# Patient Record
Sex: MALE | Race: ASIAN | NOT HISPANIC OR LATINO | URBAN - METROPOLITAN AREA
[De-identification: names, ages, dates, MRNs, and addresses within clinical notes are randomized per-mention and may not be internally consistent; named-entity substitution may affect disease eponyms.]

---

## 2017-10-19 ENCOUNTER — EMERGENCY (EMERGENCY)
Facility: HOSPITAL | Age: 22
LOS: 1 days | Discharge: HOME | End: 2017-10-19
Attending: EMERGENCY MEDICINE | Admitting: EMERGENCY MEDICINE
Payer: MEDICAID

## 2017-10-19 VITALS
DIASTOLIC BLOOD PRESSURE: 74 MMHG | OXYGEN SATURATION: 99 % | HEART RATE: 77 BPM | TEMPERATURE: 98 F | RESPIRATION RATE: 16 BRPM | SYSTOLIC BLOOD PRESSURE: 105 MMHG

## 2017-10-19 VITALS
RESPIRATION RATE: 12 BRPM | HEART RATE: 69 BPM | SYSTOLIC BLOOD PRESSURE: 100 MMHG | OXYGEN SATURATION: 99 % | DIASTOLIC BLOOD PRESSURE: 62 MMHG

## 2017-10-19 DIAGNOSIS — R56.9 UNSPECIFIED CONVULSIONS: ICD-10-CM

## 2017-10-19 LAB
ALBUMIN SERPL ELPH-MCNC: 4.6 G/DL — SIGNIFICANT CHANGE UP (ref 3.3–5)
ALP SERPL-CCNC: 40 U/L — SIGNIFICANT CHANGE UP (ref 40–120)
ALT FLD-CCNC: 16 U/L — SIGNIFICANT CHANGE UP (ref 4–41)
AST SERPL-CCNC: 20 U/L — SIGNIFICANT CHANGE UP (ref 4–40)
BASOPHILS # BLD AUTO: 0.03 K/UL — SIGNIFICANT CHANGE UP (ref 0–0.2)
BASOPHILS NFR BLD AUTO: 0.4 % — SIGNIFICANT CHANGE UP (ref 0–2)
BILIRUB SERPL-MCNC: 0.9 MG/DL — SIGNIFICANT CHANGE UP (ref 0.2–1.2)
BUN SERPL-MCNC: 13 MG/DL — SIGNIFICANT CHANGE UP (ref 7–23)
CALCIUM SERPL-MCNC: 9.1 MG/DL — SIGNIFICANT CHANGE UP (ref 8.4–10.5)
CHLORIDE SERPL-SCNC: 100 MMOL/L — SIGNIFICANT CHANGE UP (ref 98–107)
CO2 SERPL-SCNC: 26 MMOL/L — SIGNIFICANT CHANGE UP (ref 22–31)
CREAT SERPL-MCNC: 0.89 MG/DL — SIGNIFICANT CHANGE UP (ref 0.5–1.3)
EOSINOPHIL # BLD AUTO: 0.09 K/UL — SIGNIFICANT CHANGE UP (ref 0–0.5)
EOSINOPHIL NFR BLD AUTO: 1.3 % — SIGNIFICANT CHANGE UP (ref 0–6)
GLUCOSE SERPL-MCNC: 88 MG/DL — SIGNIFICANT CHANGE UP (ref 70–99)
HCT VFR BLD CALC: 44.1 % — SIGNIFICANT CHANGE UP (ref 39–50)
HGB BLD-MCNC: 15.7 G/DL — SIGNIFICANT CHANGE UP (ref 13–17)
IMM GRANULOCYTES # BLD AUTO: 0.02 # — SIGNIFICANT CHANGE UP
IMM GRANULOCYTES NFR BLD AUTO: 0.3 % — SIGNIFICANT CHANGE UP (ref 0–1.5)
LYMPHOCYTES # BLD AUTO: 1.34 K/UL — SIGNIFICANT CHANGE UP (ref 1–3.3)
LYMPHOCYTES # BLD AUTO: 19.1 % — SIGNIFICANT CHANGE UP (ref 13–44)
MCHC RBC-ENTMCNC: 29.7 PG — SIGNIFICANT CHANGE UP (ref 27–34)
MCHC RBC-ENTMCNC: 35.6 % — SIGNIFICANT CHANGE UP (ref 32–36)
MCV RBC AUTO: 83.4 FL — SIGNIFICANT CHANGE UP (ref 80–100)
MONOCYTES # BLD AUTO: 0.37 K/UL — SIGNIFICANT CHANGE UP (ref 0–0.9)
MONOCYTES NFR BLD AUTO: 5.3 % — SIGNIFICANT CHANGE UP (ref 2–14)
NEUTROPHILS # BLD AUTO: 5.17 K/UL — SIGNIFICANT CHANGE UP (ref 1.8–7.4)
NEUTROPHILS NFR BLD AUTO: 73.6 % — SIGNIFICANT CHANGE UP (ref 43–77)
NRBC # FLD: 0 — SIGNIFICANT CHANGE UP
PLATELET # BLD AUTO: 202 K/UL — SIGNIFICANT CHANGE UP (ref 150–400)
PMV BLD: 11.3 FL — SIGNIFICANT CHANGE UP (ref 7–13)
POTASSIUM SERPL-MCNC: 4.2 MMOL/L — SIGNIFICANT CHANGE UP (ref 3.5–5.3)
POTASSIUM SERPL-SCNC: 4.2 MMOL/L — SIGNIFICANT CHANGE UP (ref 3.5–5.3)
PROT SERPL-MCNC: 7.6 G/DL — SIGNIFICANT CHANGE UP (ref 6–8.3)
RBC # BLD: 5.29 M/UL — SIGNIFICANT CHANGE UP (ref 4.2–5.8)
RBC # FLD: 11.7 % — SIGNIFICANT CHANGE UP (ref 10.3–14.5)
SODIUM SERPL-SCNC: 139 MMOL/L — SIGNIFICANT CHANGE UP (ref 135–145)
WBC # BLD: 7.02 K/UL — SIGNIFICANT CHANGE UP (ref 3.8–10.5)
WBC # FLD AUTO: 7.02 K/UL — SIGNIFICANT CHANGE UP (ref 3.8–10.5)

## 2017-10-19 PROCEDURE — 99283 EMERGENCY DEPT VISIT LOW MDM: CPT

## 2017-10-19 PROCEDURE — 70450 CT HEAD/BRAIN W/O DYE: CPT | Mod: 26

## 2017-10-19 PROCEDURE — 71020: CPT | Mod: 26

## 2017-10-19 RX ORDER — LEVETIRACETAM 250 MG/1
500 TABLET, FILM COATED ORAL ONCE
Qty: 0 | Refills: 0 | Status: COMPLETED | OUTPATIENT
Start: 2017-10-19 | End: 2017-10-19

## 2017-10-19 RX ORDER — LEVETIRACETAM 250 MG/1
1 TABLET, FILM COATED ORAL
Qty: 120 | Refills: 0 | OUTPATIENT
Start: 2017-10-19 | End: 2017-12-18

## 2017-10-19 RX ADMIN — LEVETIRACETAM 500 MILLIGRAM(S): 250 TABLET, FILM COATED ORAL at 19:45

## 2017-10-19 NOTE — ED PROVIDER NOTE - PLAN OF CARE
- A prescription for Keppra has been sent to your pharmacy - please take 500mg twice a day  - Call [phone] to follow up with Neurology  - Please return with any new or worsening symptoms

## 2017-10-19 NOTE — ED ADULT NURSE NOTE - CHIEF COMPLAINT QUOTE
Pt here for seizure lasting approximately 20 seconds, has hx of seizures, currently on keppra, pt currently A&Ox3, pt was on couch during seizure, did not hit head, no incontinence. denies any injuries for seizures.

## 2017-10-19 NOTE — ED ADULT NURSE NOTE - OBJECTIVE STATEMENT
Break RN: 20 y/o male pt accompanied by wife and father, received in room 25, presents to the ed after a seizure episode, pt sts he was working and suddenly had a seizure, pt not able to give clear account of what happened, says he does not remember anything, denies falling to the floor, hitting head. pt c/o feeling like his heart is beating fast and reports intermittent L sided headache, L eye noted with redness but sts it's not new. MD crocker pending, 20G placed on L AC, VS as noted, fall prec in place, NAD, will monitor.

## 2017-10-19 NOTE — ED PROVIDER NOTE - CARE PLAN
Principal Discharge DX:	Seizure Principal Discharge DX:	Seizure  Instructions for follow-up, activity and diet:	- A prescription for Keppra has been sent to your pharmacy - please take 500mg twice a day  - Call [phone] to follow up with Neurology  - Please return with any new or worsening symptoms

## 2017-10-19 NOTE — ED ADULT TRIAGE NOTE - CHIEF COMPLAINT QUOTE
Pt here for seizure lasting approximately 20 seconds, has hx of seizures, currently on keppra, per ems pt A&OX3 currently. pt was on couch during seizure, did not hit head, no incontinence. denies any injuries for seizures. Pt here for seizure lasting approximately 20 seconds, has hx of seizures, currently on keppra, pt currently A&Ox3, pt was on couch during seizure, did not hit head, no incontinence. denies any injuries for seizures.

## 2017-10-19 NOTE — ED PROVIDER NOTE - MEDICAL DECISION MAKING DETAILS
21yoM hx of seizures pw seizure with headache. labs, ct head due to headaches, consult neurology. likely dc with close follow up.

## 2017-10-19 NOTE — ED PROVIDER NOTE - OBJECTIVE STATEMENT
21yoM hx of seizures on keppra pw likely seizure. per patient he wasn't feeling well, had some stomach pain and then layed down. then remembers waking up to EMS putting oxxygen on him. per family, he was shaking all over. typical for his seizures is belly pain before hand. patient also had a few months of headache, point headache on right side, comes and goes. no fevers, vomiting, diarrhea, chest pain, current abd pain or other issues.

## 2017-10-19 NOTE — CONSULT NOTE ADULT - SUBJECTIVE AND OBJECTIVE BOX
HPI: 22 yo male with history of GTC for 6 years. Last seizure was a year ago. Was on keppra 500mg bid then weaned down by a doctor in [facility] to 250mg bid. Today was at work then felt faint and passed out and had generalized shaking and toungue biting and confusion afterwards. No workup in the past for seizures. Now back to baseline           MEDICATIONS  (STANDING):    MEDICATIONS  (PRN):    PAST MEDICAL & SURGICAL HISTORY:  EP (epilepsy)  No significant past surgical history    FAMILY HISTORY:  No pertinent family history in first degree relatives    Allergies    No Known Allergies    Intolerances        SHx - No smoking, No ETOH, No drug abuse      Review of Systems:  CONSTITUTIONAL:  No weight loss, fever, chills, weakness or fatigue.  HEENT:  Eyes:  No visual loss, blurred vision, double vision or yellow sclerae. Ears, Nose, Throat:  No hearing loss, sneezing, congestion, runny nose or sore throat.  SKIN:  No rash or itching.  CARDIOVASCULAR:  No chest pain, chest pressure or chest discomfort. No palpitations or edema.  RESPIRATORY:  No shortness of breath, cough or sputum.  GASTROINTESTINAL:  No anorexia, nausea, vomiting or diarrhea. No abdominal pain or blood.  GENITOURINARY:  NO Burning on urination.   NEUROLOGICAL: See HPI  MUSCULOSKELETAL:  No muscle, back pain, joint pain or stiffness.  HEMATOLOGIC:  No anemia, bleeding or bruising.  LYMPHATICS:  No enlarged nodes. No history of splenectomy.  PSYCHIATRIC:  No history of depression or anxiety.  ENDOCRINOLOGIC:  No reports of sweating, cold or heat intolerance. No polyuria or polydipsia.  ALLERGIES:  No history of asthma, hives, eczema or rhinitis.        Vital Signs Last 24 Hrs  T(C): 36.8 (19 Oct 2017 15:56), Max: 36.8 (19 Oct 2017 15:56)  T(F): 98.3 (19 Oct 2017 15:56), Max: 98.3 (19 Oct 2017 15:56)  HR: 69 (19 Oct 2017 17:08) (69 - 77)  BP: 100/62 (19 Oct 2017 17:08) (100/62 - 105/74)  BP(mean): --  RR: 12 (19 Oct 2017 17:08) (12 - 16)  SpO2: 99% (19 Oct 2017 17:08) (99% - 99%)    General Exam:   General appearance: No acute distress                   Neurological Exam:  Mental Status: Orientated to self, date and place.  Attention intact.  No dysarthria, aphasia or neglect.  Knowledge intact.  Registration intact.  Short and long term memory grossly intact.      Cranial Nerves: CN I - not tested.  PERRL, EOMI, VFF, no nystagmus or diplopia.  No APD.  Fundi not visualized bilaterally.  CN V1-3 intact to light touch and pinprick.  No facial asymmetry.  Hearing intact to finger rub bilaterally.  Tongue, uvula and palate midline.  Sternocleidomastoid and Trapezius intact bilaterally.    Motor:   Tone: normal.                  Strength:     [] Upper extremity                      Delt       Bicep    Tricep                                                  R         5/5 5/5 5/5 5/5                                               L          5/5 5/5 5/5 5/5  [] Lower extremity                       HF          KE          KF        DF         PF                                               R        5/5 5/5 5/5 5/5 5/5                                               L         5/5 5/5 5/5 5/5 5/5  Pronator drift: none                 Dysmetria: None to finger-nose-finger or heel-shin-heel  No truncal ataxia.    Tremor: No resting, postural or action tremor.  No myoclonus.    Sensation: intact to light touch, pinprick, vibration and proprioception    Deep Tendon Reflexes: 1+ bilateral biceps, triceps, brachioradialis, knee and ankle  Toes flexor bilaterally    Gait: normal and stable.      Other:    10-19    139  |  100  |  13  ----------------------------<  88  4.2   |  26  |  0.89    Ca    9.1      19 Oct 2017 17:07    TPro  7.6  /  Alb  4.6  /  TBili  0.9  /  DBili  x   /  AST  20  /  ALT  16  /  AlkPhos  40  10-19    10-19    139  |  100  |  13  ----------------------------<  88  4.2   |  26  |  0.89    Ca    9.1      19 Oct 2017 17:07    TPro  7.6  /  Alb  4.6  /  TBili  0.9  /  DBili  x   /  AST  20  /  ALT  16  /  AlkPhos  40  10-19    ct head < from: CT Head No Cont (10.19.17 @ 18:49) >  No CT evidence of acute intracranial hemorrhage, brain edema, or mass   effect.     < end of copied text >                            15.7   7.02  )-----------( 202      ( 19 Oct 2017 17:07 )             44.1       Radiology    CT  MRI  EKG:  tele:  TTE:  EEG:

## 2017-10-19 NOTE — ED PROVIDER NOTE - ATTENDING CONTRIBUTION TO CARE
21M p/w seizure today.  Pt was working at a [employer], felt unwell generally and then went upstairs (an apt) and lay down and reportedly had a seizure x 10-15 minutes.  Reportedly last seizure 5 years ago, was here and admitted to Dr Long, but he has been in [facility] and has not seen him for years.  Pt was seeing a doctor in [facility] who decreased his dose to current dose which is 250 PO BID.  Denies head injury or illness, but does c/o some epigastric pain as well as off and on headache x 1-2 months.  VS:  unremarkable    GEN - NAD; well appearing; A+O x3   HEAD - NC/AT     ENT - PEERL, EOMI, mucous membranes  moist , no discharge      NECK: Neck supple, non-tender without lymphadenopathy, no masses, no JVD  PULM - CTA b/l,  symmetric breath sounds  COR -  normal heart sounds    ABD - , ND, NT, soft, no guarding, no rebound, no masses    BACK - no CVA tenderness, nontender spine     EXTREMS - no edema, no deformity, warm and well perfused    SKIN - no rash or bruising      NEUROLOGIC - alert, CN 2-12 intact, sensation nl, motor 5/5 RUE/LUE/RLE/LLE.      IMP:  21M p/w seizure today, last 5 years ago, on low dose Keppra.  Normal MS and neuro exam at present.  No abd ttp.  No sign of trauma.  Check labs, CT head given HA and ?unclear length from previous imaging (pt not sure).  Neuro eval.  Reassess.

## 2017-10-21 LAB — LEVETIRACETAM SERPL-MCNC: 4.6 MCG/ML — LOW (ref 12–46)

## 2017-10-28 NOTE — ED ADULT NURSE NOTE - NS ED NURSE RECORD ANOTHER VITAL SIGN
Yes
no decreased eating/drinking/no difficulty bearing weight/no headache/no sleeping issues/no crying/no bruising/no laceration/no loss of consciousness/no disorientation/no fussiness

## 2017-11-27 ENCOUNTER — APPOINTMENT (OUTPATIENT)
Dept: NEUROLOGY | Facility: HOSPITAL | Age: 22
End: 2017-11-27

## 2017-11-27 ENCOUNTER — OUTPATIENT (OUTPATIENT)
Dept: OUTPATIENT SERVICES | Facility: HOSPITAL | Age: 22
LOS: 1 days | End: 2017-11-27

## 2017-11-27 VITALS — SYSTOLIC BLOOD PRESSURE: 118 MMHG | WEIGHT: 107.59 LBS | DIASTOLIC BLOOD PRESSURE: 70 MMHG | HEART RATE: 78 BPM

## 2017-11-27 DIAGNOSIS — R56.9 UNSPECIFIED CONVULSIONS: ICD-10-CM

## 2017-12-11 DIAGNOSIS — G40.909 EPILEPSY, UNSPECIFIED, NOT INTRACTABLE, WITHOUT STATUS EPILEPTICUS: ICD-10-CM

## 2017-12-11 DIAGNOSIS — R56.9 UNSPECIFIED CONVULSIONS: ICD-10-CM

## 2018-01-25 ENCOUNTER — APPOINTMENT (OUTPATIENT)
Dept: NEUROLOGY | Facility: CLINIC | Age: 23
End: 2018-01-25
Payer: MEDICAID

## 2018-01-25 PROCEDURE — 95819 EEG AWAKE AND ASLEEP: CPT

## 2018-02-12 ENCOUNTER — APPOINTMENT (OUTPATIENT)
Dept: NEUROLOGY | Facility: HOSPITAL | Age: 23
End: 2018-02-12

## 2018-02-12 ENCOUNTER — OUTPATIENT (OUTPATIENT)
Dept: OUTPATIENT SERVICES | Facility: HOSPITAL | Age: 23
LOS: 1 days | End: 2018-02-12

## 2018-02-12 VITALS
DIASTOLIC BLOOD PRESSURE: 63 MMHG | HEIGHT: 64 IN | BODY MASS INDEX: 18.27 KG/M2 | WEIGHT: 107 LBS | HEART RATE: 69 BPM | SYSTOLIC BLOOD PRESSURE: 111 MMHG

## 2018-02-12 DIAGNOSIS — G40.909 EPILEPSY, UNSPECIFIED, NOT INTRACTABLE, W/OUT STATUS EPILEPTICUS: ICD-10-CM

## 2018-02-13 DIAGNOSIS — G40.909 EPILEPSY, UNSPECIFIED, NOT INTRACTABLE, WITHOUT STATUS EPILEPTICUS: ICD-10-CM

## 2018-03-09 ENCOUNTER — RX RENEWAL (OUTPATIENT)
Age: 23
End: 2018-03-09

## 2018-03-18 ENCOUNTER — APPOINTMENT (OUTPATIENT)
Dept: MRI IMAGING | Facility: IMAGING CENTER | Age: 23
End: 2018-03-18
Payer: MEDICAID

## 2018-03-18 ENCOUNTER — OUTPATIENT (OUTPATIENT)
Dept: OUTPATIENT SERVICES | Facility: HOSPITAL | Age: 23
LOS: 1 days | End: 2018-03-18
Payer: MEDICAID

## 2018-03-18 DIAGNOSIS — Z00.8 ENCOUNTER FOR OTHER GENERAL EXAMINATION: ICD-10-CM

## 2018-03-18 PROCEDURE — 70553 MRI BRAIN STEM W/O & W/DYE: CPT | Mod: 26

## 2018-03-18 PROCEDURE — 70553 MRI BRAIN STEM W/O & W/DYE: CPT

## 2018-03-18 PROCEDURE — A9585: CPT

## 2018-03-19 ENCOUNTER — RX RENEWAL (OUTPATIENT)
Age: 23
End: 2018-03-19

## 2018-03-19 RX ORDER — LEVETIRACETAM 500 MG/1
500 TABLET, FILM COATED ORAL TWICE DAILY
Qty: 120 | Refills: 5 | Status: ACTIVE | COMMUNITY
Start: 1900-01-01 | End: 1900-01-01

## 2018-06-11 ENCOUNTER — APPOINTMENT (OUTPATIENT)
Dept: NEUROLOGY | Facility: HOSPITAL | Age: 23
End: 2018-06-11

## 2018-09-10 ENCOUNTER — APPOINTMENT (OUTPATIENT)
Dept: NEUROLOGY | Facility: HOSPITAL | Age: 23
End: 2018-09-10

## 2018-12-17 ENCOUNTER — EMERGENCY (EMERGENCY)
Facility: HOSPITAL | Age: 23
LOS: 1 days | Discharge: HOME | End: 2018-12-17
Attending: EMERGENCY MEDICINE | Admitting: EMERGENCY MEDICINE
Payer: MEDICAID

## 2018-12-17 VITALS
TEMPERATURE: 98 F | RESPIRATION RATE: 18 BRPM | SYSTOLIC BLOOD PRESSURE: 100 MMHG | OXYGEN SATURATION: 99 % | HEART RATE: 65 BPM | DIASTOLIC BLOOD PRESSURE: 64 MMHG

## 2018-12-17 VITALS
HEART RATE: 80 BPM | RESPIRATION RATE: 18 BRPM | TEMPERATURE: 98 F | SYSTOLIC BLOOD PRESSURE: 110 MMHG | DIASTOLIC BLOOD PRESSURE: 74 MMHG | OXYGEN SATURATION: 100 %

## 2018-12-17 LAB
ALBUMIN SERPL ELPH-MCNC: 4.5 G/DL — SIGNIFICANT CHANGE UP (ref 3.3–5)
ALP SERPL-CCNC: 46 U/L — SIGNIFICANT CHANGE UP (ref 40–120)
ALT FLD-CCNC: 95 U/L — HIGH (ref 4–41)
AST SERPL-CCNC: 41 U/L — HIGH (ref 4–40)
BASOPHILS # BLD AUTO: 0.04 K/UL — SIGNIFICANT CHANGE UP (ref 0–0.2)
BASOPHILS NFR BLD AUTO: 0.6 % — SIGNIFICANT CHANGE UP (ref 0–2)
BILIRUB SERPL-MCNC: 0.6 MG/DL — SIGNIFICANT CHANGE UP (ref 0.2–1.2)
BUN SERPL-MCNC: 16 MG/DL — SIGNIFICANT CHANGE UP (ref 7–23)
CALCIUM SERPL-MCNC: 9.1 MG/DL — SIGNIFICANT CHANGE UP (ref 8.4–10.5)
CHLORIDE SERPL-SCNC: 103 MMOL/L — SIGNIFICANT CHANGE UP (ref 98–107)
CO2 SERPL-SCNC: 26 MMOL/L — SIGNIFICANT CHANGE UP (ref 22–31)
CREAT SERPL-MCNC: 0.8 MG/DL — SIGNIFICANT CHANGE UP (ref 0.5–1.3)
EOSINOPHIL # BLD AUTO: 0.17 K/UL — SIGNIFICANT CHANGE UP (ref 0–0.5)
EOSINOPHIL NFR BLD AUTO: 2.7 % — SIGNIFICANT CHANGE UP (ref 0–6)
GLUCOSE SERPL-MCNC: 86 MG/DL — SIGNIFICANT CHANGE UP (ref 70–99)
HCT VFR BLD CALC: 45.3 % — SIGNIFICANT CHANGE UP (ref 39–50)
HGB BLD-MCNC: 15.9 G/DL — SIGNIFICANT CHANGE UP (ref 13–17)
IMM GRANULOCYTES # BLD AUTO: 0.01 # — SIGNIFICANT CHANGE UP
IMM GRANULOCYTES NFR BLD AUTO: 0.2 % — SIGNIFICANT CHANGE UP (ref 0–1.5)
LYMPHOCYTES # BLD AUTO: 1.83 K/UL — SIGNIFICANT CHANGE UP (ref 1–3.3)
LYMPHOCYTES # BLD AUTO: 29 % — SIGNIFICANT CHANGE UP (ref 13–44)
MCHC RBC-ENTMCNC: 29.3 PG — SIGNIFICANT CHANGE UP (ref 27–34)
MCHC RBC-ENTMCNC: 35.1 % — SIGNIFICANT CHANGE UP (ref 32–36)
MCV RBC AUTO: 83.4 FL — SIGNIFICANT CHANGE UP (ref 80–100)
MONOCYTES # BLD AUTO: 0.53 K/UL — SIGNIFICANT CHANGE UP (ref 0–0.9)
MONOCYTES NFR BLD AUTO: 8.4 % — SIGNIFICANT CHANGE UP (ref 2–14)
NEUTROPHILS # BLD AUTO: 3.72 K/UL — SIGNIFICANT CHANGE UP (ref 1.8–7.4)
NEUTROPHILS NFR BLD AUTO: 59.1 % — SIGNIFICANT CHANGE UP (ref 43–77)
NRBC # FLD: 0 — SIGNIFICANT CHANGE UP
PLATELET # BLD AUTO: 186 K/UL — SIGNIFICANT CHANGE UP (ref 150–400)
PMV BLD: 10.7 FL — SIGNIFICANT CHANGE UP (ref 7–13)
POTASSIUM SERPL-MCNC: 4.1 MMOL/L — SIGNIFICANT CHANGE UP (ref 3.5–5.3)
POTASSIUM SERPL-SCNC: 4.1 MMOL/L — SIGNIFICANT CHANGE UP (ref 3.5–5.3)
PROT SERPL-MCNC: 7.6 G/DL — SIGNIFICANT CHANGE UP (ref 6–8.3)
RBC # BLD: 5.43 M/UL — SIGNIFICANT CHANGE UP (ref 4.2–5.8)
RBC # FLD: 11.5 % — SIGNIFICANT CHANGE UP (ref 10.3–14.5)
SODIUM SERPL-SCNC: 140 MMOL/L — SIGNIFICANT CHANGE UP (ref 135–145)
WBC # BLD: 6.3 K/UL — SIGNIFICANT CHANGE UP (ref 3.8–10.5)
WBC # FLD AUTO: 6.3 K/UL — SIGNIFICANT CHANGE UP (ref 3.8–10.5)

## 2018-12-17 PROCEDURE — 99284 EMERGENCY DEPT VISIT MOD MDM: CPT

## 2018-12-17 PROCEDURE — 70450 CT HEAD/BRAIN W/O DYE: CPT | Mod: 26

## 2018-12-17 RX ORDER — SODIUM CHLORIDE 9 MG/ML
1000 INJECTION INTRAMUSCULAR; INTRAVENOUS; SUBCUTANEOUS ONCE
Qty: 0 | Refills: 0 | Status: COMPLETED | OUTPATIENT
Start: 2018-12-17 | End: 2018-12-17

## 2018-12-17 RX ORDER — DIPHENHYDRAMINE HCL 50 MG
50 CAPSULE ORAL ONCE
Qty: 0 | Refills: 0 | Status: COMPLETED | OUTPATIENT
Start: 2018-12-17 | End: 2018-12-17

## 2018-12-17 RX ORDER — KETOROLAC TROMETHAMINE 30 MG/ML
15 SYRINGE (ML) INJECTION ONCE
Qty: 0 | Refills: 0 | Status: DISCONTINUED | OUTPATIENT
Start: 2018-12-17 | End: 2018-12-17

## 2018-12-17 RX ORDER — METOCLOPRAMIDE HCL 10 MG
10 TABLET ORAL ONCE
Qty: 0 | Refills: 0 | Status: COMPLETED | OUTPATIENT
Start: 2018-12-17 | End: 2018-12-17

## 2018-12-17 RX ORDER — ONDANSETRON 8 MG/1
4 TABLET, FILM COATED ORAL ONCE
Qty: 0 | Refills: 0 | Status: COMPLETED | OUTPATIENT
Start: 2018-12-17 | End: 2018-12-17

## 2018-12-17 RX ORDER — ACETAMINOPHEN 500 MG
975 TABLET ORAL ONCE
Qty: 0 | Refills: 0 | Status: COMPLETED | OUTPATIENT
Start: 2018-12-17 | End: 2018-12-17

## 2018-12-17 RX ADMIN — Medication 15 MILLIGRAM(S): at 19:16

## 2018-12-17 RX ADMIN — Medication 50 MILLIGRAM(S): at 19:29

## 2018-12-17 RX ADMIN — Medication 975 MILLIGRAM(S): at 21:12

## 2018-12-17 RX ADMIN — SODIUM CHLORIDE 1000 MILLILITER(S): 9 INJECTION INTRAMUSCULAR; INTRAVENOUS; SUBCUTANEOUS at 19:16

## 2018-12-17 RX ADMIN — Medication 10 MILLIGRAM(S): at 19:16

## 2018-12-17 NOTE — ED PROVIDER NOTE - NSFOLLOWUPINSTRUCTIONS_ED_ALL_ED_FT
Please follow up with a primary care physician to repeat your liver function tests.  They were slightly elevated in the emergency department today.

## 2018-12-17 NOTE — ED PROVIDER NOTE - PROGRESS NOTE DETAILS
patient feels improved, discharge with copy of results and recommend pcp follow up, given return precautions. patient feels improved, discharge with copy of results and recommend pcp follow up, given return precautions.  Discussed slightly elevated LFTs, recommend repeat outpatient

## 2018-12-17 NOTE — ED PROVIDER NOTE - MEDICAL DECISION MAKING DETAILS
21 y/o M presents to ED with gradual onset headache with nausea and vomiting since yesterday. No red flags on exam, but will CT head to r/o mass affect and provide symptomatic treatment.

## 2018-12-17 NOTE — ED PROVIDER NOTE - NSFOLLOWUPCLINICS_GEN_ALL_ED_FT
[facility] General Internal Medicine  General Internal Medicine  [address] [id number]  Phone: [phone]  Fax:   Follow Up Time:

## 2018-12-17 NOTE — ED PROVIDER NOTE - OBJECTIVE STATEMENT
21 y/o M with PMHx of seizure presents to ED headache since yesterday. Pt states that at work he had a gradual onset of headache and shortly after he felt nausea with abdominal pain. Associated with these symptoms pt has been vomiting. Pt reports the room was spinning yesterday, but not to much today. Pt denies having any fever. Pt is currently on Keppra for seizures.

## 2023-06-09 ENCOUNTER — EMERGENCY (EMERGENCY)
Facility: HOSPITAL | Age: 28
LOS: 1 days | Discharge: HOME | End: 2023-06-09
Admitting: EMERGENCY MEDICINE
Payer: MEDICAID

## 2023-06-09 VITALS
RESPIRATION RATE: 18 BRPM | HEART RATE: 88 BPM | TEMPERATURE: 98 F | OXYGEN SATURATION: 100 % | SYSTOLIC BLOOD PRESSURE: 120 MMHG | DIASTOLIC BLOOD PRESSURE: 81 MMHG

## 2023-06-09 VITALS
RESPIRATION RATE: 17 BRPM | OXYGEN SATURATION: 100 % | SYSTOLIC BLOOD PRESSURE: 105 MMHG | HEART RATE: 82 BPM | DIASTOLIC BLOOD PRESSURE: 65 MMHG | TEMPERATURE: 98 F

## 2023-06-09 LAB
ALBUMIN SERPL ELPH-MCNC: 4.7 G/DL — SIGNIFICANT CHANGE UP (ref 3.3–5)
ALP SERPL-CCNC: 39 U/L — LOW (ref 40–120)
ALT FLD-CCNC: 12 U/L — SIGNIFICANT CHANGE UP (ref 4–41)
ANION GAP SERPL CALC-SCNC: 13 MMOL/L — SIGNIFICANT CHANGE UP (ref 7–14)
AST SERPL-CCNC: 20 U/L — SIGNIFICANT CHANGE UP (ref 4–40)
B PERT DNA SPEC QL NAA+PROBE: SIGNIFICANT CHANGE UP
B PERT+PARAPERT DNA PNL SPEC NAA+PROBE: SIGNIFICANT CHANGE UP
BASOPHILS # BLD AUTO: 0.03 K/UL — SIGNIFICANT CHANGE UP (ref 0–0.2)
BASOPHILS NFR BLD AUTO: 0.4 % — SIGNIFICANT CHANGE UP (ref 0–2)
BILIRUB SERPL-MCNC: 0.6 MG/DL — SIGNIFICANT CHANGE UP (ref 0.2–1.2)
BORDETELLA PARAPERTUSSIS (RAPRVP): SIGNIFICANT CHANGE UP
BUN SERPL-MCNC: 16 MG/DL — SIGNIFICANT CHANGE UP (ref 7–23)
C PNEUM DNA SPEC QL NAA+PROBE: SIGNIFICANT CHANGE UP
CALCIUM SERPL-MCNC: 9.4 MG/DL — SIGNIFICANT CHANGE UP (ref 8.4–10.5)
CHLORIDE SERPL-SCNC: 101 MMOL/L — SIGNIFICANT CHANGE UP (ref 98–107)
CO2 SERPL-SCNC: 23 MMOL/L — SIGNIFICANT CHANGE UP (ref 22–31)
CREAT SERPL-MCNC: 0.79 MG/DL — SIGNIFICANT CHANGE UP (ref 0.5–1.3)
EGFR: 123 ML/MIN/1.73M2 — SIGNIFICANT CHANGE UP
EOSINOPHIL # BLD AUTO: 0.13 K/UL — SIGNIFICANT CHANGE UP (ref 0–0.5)
EOSINOPHIL NFR BLD AUTO: 1.6 % — SIGNIFICANT CHANGE UP (ref 0–6)
FLUAV SUBTYP SPEC NAA+PROBE: SIGNIFICANT CHANGE UP
FLUBV RNA SPEC QL NAA+PROBE: SIGNIFICANT CHANGE UP
GLUCOSE SERPL-MCNC: 83 MG/DL — SIGNIFICANT CHANGE UP (ref 70–99)
HADV DNA SPEC QL NAA+PROBE: SIGNIFICANT CHANGE UP
HCOV 229E RNA SPEC QL NAA+PROBE: SIGNIFICANT CHANGE UP
HCOV HKU1 RNA SPEC QL NAA+PROBE: SIGNIFICANT CHANGE UP
HCOV NL63 RNA SPEC QL NAA+PROBE: SIGNIFICANT CHANGE UP
HCOV OC43 RNA SPEC QL NAA+PROBE: SIGNIFICANT CHANGE UP
HCT VFR BLD CALC: 45.3 % — SIGNIFICANT CHANGE UP (ref 39–50)
HGB BLD-MCNC: 15.9 G/DL — SIGNIFICANT CHANGE UP (ref 13–17)
HMPV RNA SPEC QL NAA+PROBE: SIGNIFICANT CHANGE UP
HPIV1 RNA SPEC QL NAA+PROBE: SIGNIFICANT CHANGE UP
HPIV2 RNA SPEC QL NAA+PROBE: SIGNIFICANT CHANGE UP
HPIV3 RNA SPEC QL NAA+PROBE: SIGNIFICANT CHANGE UP
HPIV4 RNA SPEC QL NAA+PROBE: SIGNIFICANT CHANGE UP
IANC: 6.5 K/UL — SIGNIFICANT CHANGE UP (ref 1.8–7.4)
IMM GRANULOCYTES NFR BLD AUTO: 0.2 % — SIGNIFICANT CHANGE UP (ref 0–0.9)
LYMPHOCYTES # BLD AUTO: 0.9 K/UL — LOW (ref 1–3.3)
LYMPHOCYTES # BLD AUTO: 11.2 % — LOW (ref 13–44)
M PNEUMO DNA SPEC QL NAA+PROBE: SIGNIFICANT CHANGE UP
MAGNESIUM SERPL-MCNC: 2.2 MG/DL — SIGNIFICANT CHANGE UP (ref 1.6–2.6)
MCHC RBC-ENTMCNC: 28.9 PG — SIGNIFICANT CHANGE UP (ref 27–34)
MCHC RBC-ENTMCNC: 35.1 GM/DL — SIGNIFICANT CHANGE UP (ref 32–36)
MCV RBC AUTO: 82.2 FL — SIGNIFICANT CHANGE UP (ref 80–100)
MONOCYTES # BLD AUTO: 0.49 K/UL — SIGNIFICANT CHANGE UP (ref 0–0.9)
MONOCYTES NFR BLD AUTO: 6.1 % — SIGNIFICANT CHANGE UP (ref 2–14)
NEUTROPHILS # BLD AUTO: 6.5 K/UL — SIGNIFICANT CHANGE UP (ref 1.8–7.4)
NEUTROPHILS NFR BLD AUTO: 80.5 % — HIGH (ref 43–77)
NRBC # BLD: 0 /100 WBCS — SIGNIFICANT CHANGE UP (ref 0–0)
NRBC # FLD: 0 K/UL — SIGNIFICANT CHANGE UP (ref 0–0)
PHOSPHATE SERPL-MCNC: 3.4 MG/DL — SIGNIFICANT CHANGE UP (ref 2.5–4.5)
PLATELET # BLD AUTO: 212 K/UL — SIGNIFICANT CHANGE UP (ref 150–400)
POTASSIUM SERPL-MCNC: 4.6 MMOL/L — SIGNIFICANT CHANGE UP (ref 3.5–5.3)
POTASSIUM SERPL-SCNC: 4.6 MMOL/L — SIGNIFICANT CHANGE UP (ref 3.5–5.3)
PROT SERPL-MCNC: 7.5 G/DL — SIGNIFICANT CHANGE UP (ref 6–8.3)
RAPID RVP RESULT: SIGNIFICANT CHANGE UP
RBC # BLD: 5.51 M/UL — SIGNIFICANT CHANGE UP (ref 4.2–5.8)
RBC # FLD: 11.6 % — SIGNIFICANT CHANGE UP (ref 10.3–14.5)
RSV RNA SPEC QL NAA+PROBE: SIGNIFICANT CHANGE UP
RV+EV RNA SPEC QL NAA+PROBE: SIGNIFICANT CHANGE UP
SARS-COV-2 RNA SPEC QL NAA+PROBE: SIGNIFICANT CHANGE UP
SODIUM SERPL-SCNC: 137 MMOL/L — SIGNIFICANT CHANGE UP (ref 135–145)
WBC # BLD: 8.07 K/UL — SIGNIFICANT CHANGE UP (ref 3.8–10.5)
WBC # FLD AUTO: 8.07 K/UL — SIGNIFICANT CHANGE UP (ref 3.8–10.5)

## 2023-06-09 PROCEDURE — 71046 X-RAY EXAM CHEST 2 VIEWS: CPT | Mod: 26

## 2023-06-09 PROCEDURE — 99284 EMERGENCY DEPT VISIT MOD MDM: CPT

## 2023-06-09 PROCEDURE — 70450 CT HEAD/BRAIN W/O DYE: CPT | Mod: 26,MA

## 2023-06-09 RX ORDER — SODIUM CHLORIDE 9 MG/ML
1000 INJECTION INTRAMUSCULAR; INTRAVENOUS; SUBCUTANEOUS ONCE
Refills: 0 | Status: COMPLETED | OUTPATIENT
Start: 2023-06-09 | End: 2023-06-09

## 2023-06-09 RX ORDER — ACETAMINOPHEN 500 MG
1000 TABLET ORAL ONCE
Refills: 0 | Status: COMPLETED | OUTPATIENT
Start: 2023-06-09 | End: 2023-06-09

## 2023-06-09 RX ORDER — MECLIZINE HCL 12.5 MG
1 TABLET ORAL
Qty: 15 | Refills: 0
Start: 2023-06-09 | End: 2023-06-13

## 2023-06-09 RX ADMIN — Medication 400 MILLIGRAM(S): at 19:54

## 2023-06-09 RX ADMIN — SODIUM CHLORIDE 1000 MILLILITER(S): 9 INJECTION INTRAMUSCULAR; INTRAVENOUS; SUBCUTANEOUS at 19:54

## 2023-06-09 NOTE — ED PROVIDER NOTE - OBJECTIVE STATEMENT
28 Y/O M w/ PMH of seizure on Keppra presents to ER for fever. Has experience weakness and subjective fever for one week. No recent travel or sick contacts. Developed frontal headache 2 days ago. Improves w/ Tylenol but continues to endorse symptoms. Last seizure 7 years ago. Denies fever, chills, dizziness, visual change/loss, chest pain, shortness of breath, abdominal pain, dysuria

## 2023-06-09 NOTE — ED PROVIDER NOTE - PATIENT PORTAL LINK FT
You can access the FollowMyHealth Patient Portal offered by [facility] by registering at the following website: [email or web address]. By joining [employer]’s FollowMyHealth portal, you will also be able to view your health information using other applications (apps) compatible with our system. You can access the FollowMyHealth Patient Portal offered by [facility] by registering at the following website: [email or web address]. By joining [employer]’s FollowMyHealth portal, you will also be able to view your health information using other applications (apps) compatible with our system.

## 2023-06-09 NOTE — ED PROVIDER NOTE - INTERNATIONAL TRAVEL
8/12/22    Attempt: #1    Contacted patient to schedule ER follow up with Janette Webb. Phone not in service at time of call, unable to leave message.    Divina Salcido  Patient    No

## 2023-06-09 NOTE — ED ADULT TRIAGE NOTE - GLASGOW COMA SCALE: SCORE, MLM
[facility] sent a fax requesting a new Tizanidine 2 mg Rx.        Medication active on med list: yes      Date of last fill: 3/17/22 for #60 and 6 refills  verified on 10/26/2022    verified by Tyson Case LPN      Date of last appointment: 9/20/2022    Next Visit Date:  12/8/2022
15

## 2023-06-09 NOTE — ED PROVIDER NOTE - PHYSICAL EXAMINATION
Vital signs reviewed.   CONSTITUTIONAL: Well-appearing; well-nourished; in no apparent distress. Non-toxic appearing.   HEAD: Normocephalic, atraumatic.  EYES: PERRL, EOM intact, normal conjunctiva and no sclera injection noted  ENT: normal nose; no rhinorrhea  NECK: FAROM. No midline tenderness  CARD: Normal S1, S2  RESP: Normal chest excursion with respiration; breath sounds clear and equal bilaterally  EXT/MS: moves all extremities; distal pulses are normal, no pedal edema.  SKIN: Normal for age and race; warm; dry; good turgor; no apparent lesions or exudate noted.  NEURO: Awake, alert, oriented x 3  PSYCH: Normal mood; appropriate affect.

## 2023-06-09 NOTE — ED ADULT NURSE NOTE - OBJECTIVE STATEMENT
Pt received to intake presents with n/v and headache beginning this morning. Pt a&ox3, ambulatory at baseline, skin intact, respirations even and unlabored, abd soft and non-distended, nontender to palpation. 20g IV placed in left arm, labs drawn and sent, pt medicated as per ordered, will continue to monitor.

## 2023-06-09 NOTE — ED ADULT NURSE NOTE - NSFALLUNIVINTERV_ED_ALL_ED
Bed/Stretcher in lowest position, wheels locked, appropriate side rails in place/Call bell, personal items and telephone in reach/Instruct patient to call for assistance before getting out of bed/chair/stretcher/Non-slip footwear applied when patient is off stretcher/[place] to call system/Physically safe environment - no spills, clutter or unnecessary equipment/Purposeful proactive rounding/Room/bathroom lighting operational, light cord in reach

## 2023-06-09 NOTE — ED PROVIDER NOTE - NS ED ROS FT
Constitutional: (+) fever   Head: Normal cephalic, Atraumatic  Eyes/ENT: (-) sore trhoat  Cardiovascular: (-) chest pain, (-) wheezing  Respiratory: (-) cough, (-) shortness of breath  Gastrointestinal: (-) vomiting, (-) diarrhea, (-) abdominal pain  : (-) dysuria   Musculoskeletal: (-) back pain  Integumentary: (-) rash, (-) edema  Neurological: (-)loc (+) headache  Allergic/Immunologic: (-) pruritus

## 2023-06-09 NOTE — ED PROVIDER NOTE - NSFOLLOWUPCLINICS_GEN_ALL_ED_FT
[facility] Gastroenterology  Gastroenterology  [address] [place] [id number]  Phone: [phone]  Fax:   Follow Up Time: Routine

## 2023-06-09 NOTE — ED PROVIDER NOTE - NSFOLLOWUPINSTRUCTIONS_ED_ALL_ED_FT
Rectal Bleeding    Rectal bleeding is when blood passes out of the opening between the buttocks (anus). People with rectal bleeding may notice bright red blood in their underwear or in the toilet after having a bowel movement. They may also have blood mixed with their stool (feces), or dark red or black stools.    Rectal bleeding is usually a sign that something is wrong. Many things can cause rectal bleeding, including:  Diverticulosis. This is a condition in which pockets or sacs project from the bowel.  Hemorrhoids. These are blood vessels around the anus or inside the rectum that are larger than normal.  Anal fissures. This is a tear in the anus.  Proctitis and colitis. These are conditions in which the rectum, colon, or anus become inflamed.  Polyps. These are growths that can be cancerous (malignant) or noncancerous (benign).  Infections of the intestines.  Fistulas. These are abnormal openings in the rectum and anus.  Rectal prolapse. This is when a part of the rectum sticks out from the anus.  Follow these instructions at home:  Pay attention to any changes in your symptoms. Take these actions to help reduce bleeding and discomfort:    Medicines    Take over-the-counter and prescription medicines only as told by your health care provider.  Ask your health care provider about changing or stopping your regular medicines or supplements. This is especially important if you are taking blood thinners. Medicines that thin the blood can make rectal bleeding worse.  Managing constipation      Your condition may cause constipation. To prevent or treat constipation, or to help make your stools soft, you may need to:  Drink enough fluid to keep your urine pale yellow.  Take over-the-counter or prescription medicines.  Eat foods that are high in fiber, such as beans, whole grains, and fresh fruits and vegetables. Ask your health care provider if you need a supplement to give you more fiber.  Limit foods that are high in fat and processed sugars, such as fried or sweet foods.  General instructions    Try not to strain when having a bowel movement.  Try taking a warm bath. This may help to soothe any pain in your rectum.  Keep all follow-up visits as told by your health care provider. This is important.  Contact a health care provider if you:  Have pain or tenderness in your abdomen.  Have a fever.  Have weakness.  Have nausea.  Cannot have a bowel movement.  Get help right away if you have:  New or increased rectal bleeding.  Black or dark red stools.  Vomit with blood or something that looks like coffee grounds.  A fainting episode.  Severe pain in your rectum.  Summary  Rectal bleeding is usually a sign that something is wrong. This condition should be evaluated by a health care provider.  Eat a diet that is high in fiber. This will help keep your stools soft, making it easier to pass stools without straining.  Medicines that thin the blood can make rectal bleeding worse.  Get help right away if you have new or increased rectal bleeding, black or dark red stools, blood in your vomit, an episode of fainting, or severe pain in your rectum. Headache    A headache is pain or discomfort felt around the head or neck area. The specific cause of a headache may not be found as there are many types including tension headaches, migraine headaches, and cluster headaches. Watch your condition for any changes. Things you can do to manage your pain include taking over the counter and prescription medications as instructed by your health care provider, lying down in a dark quiet room, limiting stress, getting regular sleep, and refraining from alcohol and tobacco products.    SEEK IMMEDIATE MEDICAL CARE IF YOU HAVE ANY OF THE FOLLOWING SYMPTOMS: fever, vomiting, stiff neck, loss of vision, problems with speech, muscle weakness, loss of balance, trouble walking, passing out, or confusion.

## 2023-06-09 NOTE — ED PROVIDER NOTE - PRO INTERPRETER NEED 2
Remote alert received for pause episode on 6/2/20 at 0948.  EGM shows AFib with a 2 second pause followed by a single beat and then a 4 second pause.  Patient has had both daytime and nighttime pauses in the past and has been asymptomatic. Patient states he was also asymptomatic at this time.      Pt instructed to notify the clinic if he develops symptoms of severe lightheadedness or dizziness either at rest or with activity.  He stated the only time he has felt lightheaded is if he bends over and then stands up too quickly.  Explained this is likely related to blood pressure changes and encouraged patient to move slowly between positions.  Pt verbalized understanding and will call the clinic with any questions.      AMALIA Ackerman         
English

## 2023-06-09 NOTE — ED ADULT TRIAGE NOTE - CHIEF COMPLAINT QUOTE
c/o fever, nausea and vomiting for the past 3 days, reports feels like might have a seizure, hx of seizures int he past, on Keppra.

## 2023-06-09 NOTE — ED PROVIDER NOTE - CLINICAL SUMMARY MEDICAL DECISION MAKING FREE TEXT BOX
28 Y/O M w/ PMH of seizure on Keppra presents to ER for fever  Discussed likely DX of viral illness  CT r/o bleed vs mass  IVF, viral panel, labs, symptom management

## 2024-11-20 NOTE — ED ADULT NURSE NOTE - NSSISCREENINGQ5_ED_A_ED
Case Management Discharge Note                Selected Continued Care - Discharged on 11/20/2024 Admission date: 11/19/2024 - Discharge disposition: Home or Self Care      Destination    No services have been selected for the patient.                Durable Medical Equipment    No services have been selected for the patient.                Dialysis/Infusion    No services have been selected for the patient.                Home Medical Care    No services have been selected for the patient.                Therapy    No services have been selected for the patient.                Community Resources    No services have been selected for the patient.                Community & DME    No services have been selected for the patient.                    Transportation Services  Private: Car    Final Discharge Disposition Code: 01 - home or self-care  
No